# Patient Record
(demographics unavailable — no encounter records)

---

## 2024-11-18 NOTE — HISTORY OF PRESENT ILLNESS
[FreeTextEntry1] : low back pain [de-identified] : 71 y old female patient with PMH of HTN (off meds now), glaucoma, hx of intestinal metaplasia in stomach presenting for follow up.

## 2024-11-18 NOTE — PHYSICAL EXAM
[No Acute Distress] : no acute distress [Well Nourished] : well nourished [No Respiratory Distress] : no respiratory distress  [No Accessory Muscle Use] : no accessory muscle use [Normal Rate] : normal rate  [Regular Rhythm] : with a regular rhythm [Normal S1, S2] : normal S1 and S2 [No Edema] : there was no peripheral edema [Soft] : abdomen soft [Non Tender] : non-tender [Non-distended] : non-distended [No Joint Swelling] : no joint swelling [No Focal Deficits] : no focal deficits [Normal Insight/Judgement] : insight and judgment were intact [de-identified] : hypertonic paraspinals

## 2024-11-18 NOTE — END OF VISIT
[] : Resident [FreeTextEntry3] : I saw the patient, examined the patient independently, reviewed medical record, and provided the medical services. Agree with resident note as personally edited above. eating red meat and wine, which is likely provoking gout had good experience c cyclobenzaprine higher dose for muscle tightness, will renew

## 2024-11-18 NOTE — REVIEW OF SYSTEMS
[Abdominal Pain] : abdominal pain [Constipation] : constipation [Joint Pain] : joint pain [Back Pain] : back pain [Fever] : no fever [Chills] : no chills [Recent Change In Weight] : ~T no recent weight change [Chest Pain] : no chest pain [Palpitations] : no palpitations [Shortness Of Breath] : no shortness of breath [Dysuria] : no dysuria [Skin Rash] : no skin rash [Headache] : no headache

## 2024-11-18 NOTE — ASSESSMENT
[FreeTextEntry1] : 71 y old female patient with PMH of HTN (off meds now), glaucoma, hx of intestinal metaplasia in stomach presenting for follow up.   #Intestinal Metaplasia #GERD #constipation - noted on EGD in 2020 - GI recommended f/u EGD in 2023 - will renew Pepcid - will get OTC laxatives - will give GI referral for EGD/Colonscopy GERD; Anti-reflux diet d/w patient Avoid laying down after meals Smoking cessation encouraged, if applicable Counseled on use of extra pillows at night time to elevate position Discussed treatment options and all side effects  #low back pain with no red flags -cyclobenzaprine at bedtime prn counselled patient on how/when to take medication and about side effects tolerating well -physical therapy referral  #Gout c occasional classic podagra -has infrequent attacks -increased water intake recommended -diet modification reinforced: limit alcohol, red meet intake -colchicine course can be used at time of gout attack  counselled patient on how/when to take medication and about side effects  #Dyslipidemia - Low fat, low cholesterol diet. - Discussed importance of eating a heart healthy diet - Counseled on aerobic exercise and weight loss - Fasting lipid panel every 3 months - Treatment options and possible side effects discussed - Patient counseled on effects of ETOH on lipid levels - will consider statin after reviewing [facility] records  #Hypertension - previously on amlodipine, but has not been taking since last visit - /83 in office - Counseled on importance of lifestyle modifications including diet, exercise, and pharmacologics - DASH diet discussed and recommended - Recommend minimizing caffeine intake - Exercise and weight loss counseled - Frequency and target at home BP readings discussed - Treatment options and possible side effects discussed - Patient counseled on symptoms of hypo/hypertension - Counseled: Yearly Ophthalmology exams - continue to monitor off meds given BP in office  #left Upper Quadrant pain -pain appreciated on left chest wall palpation -will give Diclofenac gel for rib pain  #Osteopenia - DEXA (Nov 2019) demonstrated osteopenia - c/w calcium + Vit D3 Osteoporosis/Osteopenia; Ensure adequate dietary calcium and Vitamin D intake, or supplement if deficient. Annual height measurement Weight bearing exercises Avoid smoking Limit alcohol and caffeine Limit PPI use 30 minutes of sunlight exposure on at least 5 days per week Fall Prevention discussed  #Glaucoma - c/w latanoprost 1gtt ou qhs  #Healthcare Management - Colorectal Cancer: Dec 2018 (normal) - Cervical Cancer: hrHPV neg + pap NILM (June 2022); Has GYN appointment in March 2025 - Breast Cancer: BI-RADS 2 (Nov 2024);  - Osteoporosis: Nov 2019 (DEXA showed osteopenia) - HCV: negative (May 2022) - HIV: negative (June 2023) - Tetanus: Aug 2019 (DTaP) - Influenza: will give at this visit - Pneumonia: PCV-13 (Oct 2019); PPSV23 (Aug 2020) - Labwork: CBC, CMP, A1c, TSH, Lipid Panel - RTC in 6 months or PRN.

## 2025-03-18 NOTE — PLAN
[FreeTextEntry1] : 72yo  here for annual with dysuria   - [facility]P guidelines reviewed and patient desires to continue with cervical cancer screening given h/o of HPV (3 prior neg) -f/u pap -f/u urine culture

## 2025-03-18 NOTE — HISTORY OF PRESENT ILLNESS
[FreeTextEntry1] : 72yo  with h/o abnormal pap (s/p 3 normal recent ) here for annual Patient reports some  burning with urination x 1 month Denies suprapubic pain or fevers  Denies postmenopausal bleeding  Currently sexually active with HIV+ partner (undetectable viral load) and only use condoms on occasion.   Denies pelvic pain, unusual vaginal discharge, SI/UI or dyspareunia Denies significant family h/o GI/GYN cancer  Denies unintentional weightloss   GYN Hx: +h/o fibroids and abnormal pap smears. Denies other STI   HCM:  last pap: 2022 NILM, HPV neg mammo: BIRADS 1 colonoscopy (>45): f/u PCP

## 2025-05-14 NOTE — HISTORY OF PRESENT ILLNESS
[Heartburn] : denies heartburn [Nausea] : denies nausea [Vomiting] : denies vomiting [Diarrhea] : denies diarrhea [Constipation] : denies constipation [Yellow Skin Or Eyes (Jaundice)] : denies jaundice [Abdominal Pain] : denies abdominal pain [Abdominal Swelling] : denies abdominal swelling [Rectal Pain] : denies rectal pain [Fair Compliance] : fair compliance with treatment [FreeTextEntry1] : 70 yo Female [facility] for H.pylori gastritis s/p eradication, gastric intestinal metaplasia   Patient presents for follow up today.  Endorsing constipation and intermittent crampy abdominal pain. Unrelated to PO intake, improving with bowel movements. Does not wake her up at night.  Patient was recommended to do surveillance of IM in 2023 but not performed. Endorses intermittent reflux symptoms. States Pepcid does not help. States that she is no longer losing weight, is gaining weight now. Denies any other complaints.   For unintentional weight loss pt underwent EGD and colonoscopy in 12/2018  EGD - gastritis, duodenitis (Path - + H.pylori, + focal intestinal metaplasia )  Colonoscopy - good prep , diverticulosis, hemorrhoids , otherwise normal colon  VCE - 3/2019 - Non bleeding AVM at 61 %  [Wt Loss ___ Lbs] : no recent weight loss

## 2025-05-14 NOTE — PHYSICAL EXAM
[Alert] : alert [Normal Voice/Communication] : normal voice/communication [Healthy Appearing] : healthy appearing [No Acute Distress] : no acute distress [Sclera] : the sclera and conjunctiva were normal [Hearing Threshold Finger Rub Not [place]] : hearing was normal [Normal Lips/Gums] : the lips and gums were normal [Oropharynx] : the oropharynx was normal [Normal Appearance] : the appearance of the neck was normal [No Neck Mass] : no neck mass was observed [No Respiratory Distress] : no respiratory distress [No Acc Muscle Use] : no accessory muscle use [Respiration, Rhythm And Depth] : normal respiratory rhythm and effort [Bowel Sounds] : normal bowel sounds [Abdomen Tenderness] : non-tender [No Masses] : no abdominal mass palpated [Abdomen Soft] : soft [] : no hepatosplenomegaly [Oriented To Time, Place, And Person] : oriented to person, place, and time

## 2025-05-14 NOTE — ASSESSMENT
[FreeTextEntry1] : 70 yo Female being [facility] for H.pylori gastritis s/p eradication, gastric intestinal metaplasia   #GERD #Hx IM #Hx H. pylori s/p eradication EGD - gastritis, duodenitis (Path - + H.pylori, + focal intestinal metaplasia )  Takes pepcid without relief  Rec Will scheudle for repeat EGD PPI qd head end of bed elevation last meal 2-3 hours before going to bed Avoid fatty foods, caffeine, chocolate, spicy foods, food with high fat content, carbonated beverages, and peppermint Avoid smoking and alcohol  #constipation Has bowel movements every few days. States she takes a laxative as needed unsure which  Rec High fiber diet Adequate water intake Miralax BID and senna qhs. If having copious bowel movements can reduce amount of miralax   #Change in habits-new Will schedule colonoscopy